# Patient Record
Sex: FEMALE | Race: WHITE | Employment: PART TIME | URBAN - METROPOLITAN AREA
[De-identification: names, ages, dates, MRNs, and addresses within clinical notes are randomized per-mention and may not be internally consistent; named-entity substitution may affect disease eponyms.]

---
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
## 2022-09-01 ENCOUNTER — APPOINTMENT (OUTPATIENT)
Dept: GENERAL RADIOLOGY | Age: 22
End: 2022-09-01
Payer: COMMERCIAL

## 2022-09-01 ENCOUNTER — HOSPITAL ENCOUNTER (EMERGENCY)
Age: 22
Discharge: HOME | End: 2022-09-01
Payer: COMMERCIAL

## 2022-09-01 VITALS
DIASTOLIC BLOOD PRESSURE: 89 MMHG | WEIGHT: 115 LBS | SYSTOLIC BLOOD PRESSURE: 125 MMHG | HEIGHT: 67 IN | OXYGEN SATURATION: 100 % | HEART RATE: 85 BPM | TEMPERATURE: 98.1 F | BODY MASS INDEX: 18.05 KG/M2 | RESPIRATION RATE: 15 BRPM

## 2022-09-01 DIAGNOSIS — R06.02 SHORTNESS OF BREATH: Primary | ICD-10-CM

## 2022-09-01 DIAGNOSIS — U07.1 SARS-COV-2 POSITIVE: ICD-10-CM

## 2022-09-01 LAB
D DIMER: 0.32 UG/ML FEU (ref 0–0.6)
HCG QUALITATIVE: NEGATIVE

## 2022-09-01 PROCEDURE — 84703 CHORIONIC GONADOTROPIN ASSAY: CPT

## 2022-09-01 PROCEDURE — 99285 EMERGENCY DEPT VISIT HI MDM: CPT

## 2022-09-01 PROCEDURE — 6370000000 HC RX 637 (ALT 250 FOR IP): Performed by: NURSE PRACTITIONER

## 2022-09-01 PROCEDURE — 93005 ELECTROCARDIOGRAM TRACING: CPT | Performed by: EMERGENCY MEDICINE

## 2022-09-01 PROCEDURE — 85379 FIBRIN DEGRADATION QUANT: CPT

## 2022-09-01 PROCEDURE — 71045 X-RAY EXAM CHEST 1 VIEW: CPT

## 2022-09-01 RX ORDER — IPRATROPIUM BROMIDE AND ALBUTEROL SULFATE 2.5; .5 MG/3ML; MG/3ML
1 SOLUTION RESPIRATORY (INHALATION) ONCE
Status: COMPLETED | OUTPATIENT
Start: 2022-09-01 | End: 2022-09-01

## 2022-09-01 RX ORDER — ALBUTEROL SULFATE 90 UG/1
2 AEROSOL, METERED RESPIRATORY (INHALATION) 4 TIMES DAILY PRN
Qty: 18 G | Refills: 0 | Status: SHIPPED | OUTPATIENT
Start: 2022-09-01 | End: 2022-09-09

## 2022-09-01 RX ADMIN — IPRATROPIUM BROMIDE AND ALBUTEROL SULFATE 1 AMPULE: .5; 2.5 SOLUTION RESPIRATORY (INHALATION) at 17:39

## 2022-09-01 ASSESSMENT — PAIN DESCRIPTION - PAIN TYPE: TYPE: ACUTE PAIN

## 2022-09-01 ASSESSMENT — PAIN DESCRIPTION - ORIENTATION: ORIENTATION: RIGHT;LEFT

## 2022-09-01 ASSESSMENT — PAIN SCALES - GENERAL: PAINLEVEL_OUTOF10: 5

## 2022-09-01 ASSESSMENT — PAIN DESCRIPTION - DESCRIPTORS: DESCRIPTORS: PRESSURE

## 2022-09-01 ASSESSMENT — PAIN DESCRIPTION - LOCATION: LOCATION: CHEST

## 2022-09-01 ASSESSMENT — PAIN - FUNCTIONAL ASSESSMENT: PAIN_FUNCTIONAL_ASSESSMENT: 0-10

## 2022-09-01 NOTE — ED NOTES
Incentive spirometer provided to patient and read back teach back method used for instruction. Patient both demonstrated and verbalized correct usage. Discharge instructions explained by ED provider. Patient verbalized understanding and denies any other concerns or complaints at this time. Patient vital signs stable and no acute signs or symptoms of distress noted at discharge. Patient deemed clinically stable. Patient d/c home with family.      Deshaun Jimenes RN  09/01/22 8811

## 2022-09-01 NOTE — ED PROVIDER NOTES
[address] [place] [address]  ED  [address] [address]  Dept: [phone]  Dept Fax: [phone]  Loc: [facility]        This patient was not seen or evaluated by the attending physician. I evaluated this patient, the attending physician was available for consultation. CHIEF COMPLAINT    Chief Complaint   Patient presents with    Positive For Covid-19     Patient reporting she tested positive for COVID on Tuesday. Reports today she started having chest pain        HPI    Fifi Silva is a 25 y.o. female who presents the emergency department with her boyfriend for complaint of shortness of breath and chest pain. She tested positive for COVID on Tuesday. She has been vaccinated. She states she developed chest pain, shortness of breath and coughing. She states it hurts when taking a deep breath in. She denies headache, ear pain, abdominal pain, nausea, vomiting, lightheadedness, dizziness. REVIEW OF SYSTEMS    Cardiac: + chest pain, no palpitations, no syncope  Respiratory: see HPI, + cough  Neurologic: no syncope or LOC  GI: no vomiting, no diarrhea  General: denies fever  All other systems reviewed and are negative. PAST MEDICAL & SURGICAL HISTORY    Past Medical History:   Diagnosis Date    COVID-19      No past surgical history on file.     CURRENT MEDICATIONS  (may include discharge medications prescribed in the ED)  Current Outpatient Rx   Medication Sig Dispense Refill    albuterol sulfate HFA (VENTOLIN HFA) 108 (90 Base) MCG/ACT inhaler Inhale 2 puffs into the lungs 4 times daily as needed for Wheezing Please administer a spacer with this MDI 18 g 0       ALLERGIES    Allergies   Allergen Reactions    Amoxicillin Rash     Itchy papular rash last 2 days RX ? allergy         SOCIAL & FAMILY HISTORY    Social History     Socioeconomic History    Marital status: Unknown   Tobacco Use    Smoking status: Never Smokeless tobacco: Never   Substance and Sexual Activity    Alcohol use: Never    Drug use: Never     No family history on file. PHYSICAL EXAM    VITAL SIGNS: /89   Pulse 85   Temp 98.1 °F (36.7 °C) (Oral)   Resp 15   Ht 5' 7\" (1.702 m)   Wt 115 lb (52.2 kg)   LMP 08/11/2022   SpO2 100%   BMI 18.01 kg/m²    Constitutional:  Well developed, well nourished, no acute distress   HENT:  Atraumatic, moist mucus membranes  Neck: supple, no JVD   Respiratory: Respirations even and unlabored. Breath sounds clear throughout auscultation but she is persistently coughing with a dry bronchospasm cough no distress  Cardiovascular: Regular rhythm and rate, S1-S2. No murmur  Vascular: Radial and DP pulses 2+ and equal bilaterally  GI:  Soft, nontender, normal bowel sounds  Musculoskeletal:  no lower extremity edema, no lower extremity asymmetry, no calf tenderness, no thigh tenderness, no acute deformities  Integument:  Skin is warm and dry, no petechiae   Neurologic:  Alert & oriented, no slurred speech  Psych: Pleasant affect, no hallucinations    EKG    reviewed by myself and interpreted by Dr. Judge Musa  Ventricular rate 83 bpm, WI interval 140 ms, QRS duration 82 ms,  ms,  No ST elevation or depression. Interpretation is a normal sinus rhythm. No old EKGs found for comparison    RADIOLOGY/PROCEDURES    XR CHEST PORTABLE   Final Result   No acute cardiopulmonary findings           Labs Reviewed   D-DIMER, QUANTITATIVE   HCG, SERUM, QUALITATIVE       ED COURSE & MEDICAL DECISION MAKING    Pertinent Labs & Imaging studies reviewed and interpreted. (See chart for details)    See chart for details of medications given during the ED stay.     Vitals:    09/01/22 1610 09/01/22 1614 09/01/22 1741   BP:  125/89    Pulse:  85 85   Resp:  14 15   Temp:  98.1 °F (36.7 °C)    TempSrc:  Oral    SpO2:  100% 100%   Weight: 115 lb (52.2 kg)     Height: 5' 7\" (1.702 m)       Medications   ipratropium-albuterol (DUONEB) nebulizer solution 1 ampule (1 ampule Inhalation Given 9/1/22 2683)     I have seen and evaluated this patient. My attending physician was available for consultation. Differential Diagnosis: Pulmonary Embolus, Pneumonia, Pneumothorax, other    She is nontoxic in appearance and hemodynamically stable. She has a persistent cough. I ordered a nebulizer treatment. She states she feels a lot better and that the cough has actually subsided. Chest x-ray interpreted by radiology reviewed by myself showed no acute cardiopulmonary findings. D-dimer 0.32    Patient was reassured. I will send her home with an incentive spirometer and MDI. She was given PCP to follow-up as needed. She was instructed to return to the emergency department otherwise for worsening symptoms. Patient and her boyfriend verbalized understanding of the discharge instructions and she ambulated out of the emergency department with a steady gait. FINAL IMPRESSION    1. Shortness of breath    2.  SARS-CoV-2 positive        PLAN  Discharge    (Please note that this note was completed with a voice recognition program.  Every attempt was made to edit the dictations, but inevitably there remain words that are mis-transcribed.)        Sabra Good, DIAZ - FIDEL  09/02/22 8864

## 2022-09-02 LAB
EKG ATRIAL RATE: 83 BPM
EKG DIAGNOSIS: NORMAL
EKG P AXIS: 62 DEGREES
EKG P-R INTERVAL: 140 MS
EKG Q-T INTERVAL: 352 MS
EKG QRS DURATION: 82 MS
EKG QTC CALCULATION (BAZETT): 413 MS
EKG R AXIS: 88 DEGREES
EKG T AXIS: 48 DEGREES
EKG VENTRICULAR RATE: 83 BPM

## 2022-09-02 PROCEDURE — 93010 ELECTROCARDIOGRAM REPORT: CPT | Performed by: INTERNAL MEDICINE

## 2022-09-09 ENCOUNTER — TELEMEDICINE (OUTPATIENT)
Dept: PRIMARY CARE CLINIC | Age: 22
End: 2022-09-09
Payer: COMMERCIAL

## 2022-09-09 DIAGNOSIS — Z76.89 ENCOUNTER TO ESTABLISH CARE: Primary | ICD-10-CM

## 2022-09-09 DIAGNOSIS — U07.1 COVID-19: ICD-10-CM

## 2022-09-09 PROCEDURE — 1036F TOBACCO NON-USER: CPT | Performed by: FAMILY MEDICINE

## 2022-09-09 PROCEDURE — G8427 DOCREV CUR MEDS BY ELIG CLIN: HCPCS | Performed by: FAMILY MEDICINE

## 2022-09-09 PROCEDURE — 99204 OFFICE O/P NEW MOD 45 MIN: CPT | Performed by: FAMILY MEDICINE

## 2022-09-09 PROCEDURE — G8419 CALC BMI OUT NRM PARAM NOF/U: HCPCS | Performed by: FAMILY MEDICINE

## 2022-09-09 RX ORDER — NORGESTIMATE AND ETHINYL ESTRADIOL 0.25-0.035
1 KIT ORAL DAILY
COMMUNITY

## 2022-09-09 ASSESSMENT — ENCOUNTER SYMPTOMS
COUGH: 1
SHORTNESS OF BREATH: 0
ABDOMINAL PAIN: 0
EYE PAIN: 0
CONSTIPATION: 0
DIARRHEA: 0

## 2022-09-09 NOTE — PROGRESS NOTES
2022    TELEHEALTH EVALUATION -- Audio/Visual (During KPRPS-38 public health emergency)    HPI:    Jo Shea (:  2000) has requested an audio/video evaluation for the following concern(s):    Seen in ED on 2022 for shortness of breath and cough and CP- diagnosed with COVID. Neb treatment in ED. Reviewed CXR. Feeling back to normal now. Returning to work today. H/o anxiety and depression - sees therapist once weekly. 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 Review of Systems   Constitutional:  Negative for appetite change, chills, fatigue and fever. HENT:  Negative for congestion. Eyes:  Negative for pain and visual disturbance. Respiratory:  Positive for cough. Negative for shortness of breath. Cardiovascular:  Negative for chest pain and palpitations. Gastrointestinal:  Negative for abdominal pain, constipation and diarrhea. Genitourinary:  Negative for difficulty urinating. Musculoskeletal:  Negative for arthralgias. Skin:  Negative for rash and wound. Neurological:  Negative for dizziness, weakness, light-headedness and headaches. Hematological:  Does not bruise/bleed easily. Psychiatric/Behavioral:  Negative for behavioral problems. Prior to Visit Medications    Medication Sig Taking? Authorizing Provider   norgestimate-ethinyl estradiol (ORTHO-CYCLEN) 0.25-35 MG-MCG per tablet Take 1 tablet by mouth daily Yes Historical Provider, MD       Social History     Tobacco Use    Smoking status: Never    Smokeless tobacco: Never   Vaping Use    Vaping Use: Never used   Substance Use Topics    Alcohol use: Never    Drug use: Never        Allergies   Allergen Reactions    Amoxicillin Rash     Itchy papular rash last 2 days RX ? allergy     ,   Past Medical History:   Diagnosis Date    Anxiety     COVID-19     Mild episode of recurrent major depressive disorder ([facility] [address]    , History reviewed. No pertinent surgical history. ,   Social History     Tobacco Use    Smoking status: Never    Smokeless tobacco: Never   Vaping Use    Vaping Use: Never used   Substance Use Topics    Alcohol use: Never    Drug use: Never   ,   Family History   Problem Relation Age of Onset    Osteoarthritis Mother     Hypertension Father     High Cholesterol Father     No Known Problems Sister     No Known Problems Brother    ,   Immunization History   Administered Date(s) Administered    COVID-19, PFIZER GRAY top, DO NOT Dilute, (age 15 y+), IM, 30 mcg/0.3 mL 07/26/2022    COVID-19, PFIZER PURPLE top, DILUTE for use, (age 15 y+), 30mcg/0.3mL 07/09/2021, 07/31/2021    DTaP vaccine 2002, 02/21/2003, 04/23/2003, 11/29/2007    DTaP/HiB 01/21/2004    HIB PRP-T (ActHIB, Hiberix) 2002, 02/21/2003, 04/23/2003    Hepatitis A Ped/Adol (Havrix, Vaqta) 03/12/2020, 03/25/2021    Hepatitis B Ped/Adol (Engerix-B, Recombivax HB) 2000, 07/23/2003    Influenza A (V4G7-83) Vaccine Nasal 11/10/2009, 12/15/2009    Influenza Virus Vaccine 11/12/2015, 11/29/2016, 11/16/2017    Influenza, FLUARIX, FLULAVAL, FLUZONE (age 10 mo+) AND AFLURIA, (age 1 y+), PF, 0.5mL 11/29/2017, 02/11/2019    Influenza, FLUMIST, (age 2-51 y), Live, Intranasal, 0.2mL 02/04/2013    MMR 01/21/2004    Meningococcal B, OMV (Bexsero) 03/25/2021    Meningococcal MPSV4 (Menomune) 02/25/2014    Meningococcal, MCV4, Unspecifd Conjugate (A,C,Y and W-135) 03/12/2020    Pneumococcal Conjugate 7-valent ([place]) 2002, 02/21/2003, 04/23/2003, 11/05/2003    Polio IPV (IPOL) 2002, 02/21/2003, 04/19/2004, 11/29/2007    Tdap (Boostrix, Adacel) 02/25/2014    Varicella (Varivax) 11/05/2003, 11/29/2007   ,   Health Maintenance   Topic Date Due    Hepatitis B vaccine (3 of 3 - 3-dose primary series) 09/17/2003    HPV vaccine (1 - 2-dose series) Never done    Depression Screen  Never done    HIV screen  Never done    Chlamydia screen  Never done    Hepatitis C screen  Never done    Flu vaccine (1) 09/01/2022 DTaP/Tdap/Td vaccine (7 - Td or Tdap) 02/25/2024    Hepatitis A vaccine  Completed    Hib vaccine  Completed    Varicella vaccine  Completed    Meningococcal (ACWY) vaccine  Completed    COVID-19 Vaccine  Completed    Pneumococcal 0-64 years Vaccine  Aged Out       PHYSICAL EXAMINATION:  [ INSTRUCTIONS:  \"[x]\" Indicates a positive item  \"[]\" Indicates a negative item  -- DELETE ALL ITEMS NOT EXAMINED]  [x] Alert  [x] Oriented to person/place/time    [x] No apparent distress  [] Toxic appearing    [] Face flushed appearing [x] Sclera clear  [] Lips are cyanotic      [x] Breathing appears normal  [] Appears tachypneic      [] Rash on visible skin    [x] Cranial Nerves II-XII grossly intact    [] Motor grossly intact in visible upper extremities    [] Motor grossly intact in visible lower extremities    [x] Normal Mood  [] Anxious appearing    [] Depressed appearing  [] Confused appearing      Due to this being a TeleHealth encounter, evaluation of the following organ systems is limited: Vitals/Constitutional/EENT/Resp/CV/GI//MS/Neuro/Skin/Heme-Lymph-Imm. ASSESSMENT/PLAN:  1. Encounter to establish care  VS reviewed from ED note  BMI reviewed from ED note  All questions answered. F/u discussed. Appropriate healthy lifestyle modifications discussed. Uses BC, no refills needed today    2. COVID-19  Symptoms have resolved - breathing at baseline, no CP  She is returning to work today. Fully vaccinated. No questions or concerns. Discussed RTC precaustions. No follow-ups on file. No future appointments. An  electronic signature was used to authenticate this note. --Jasmina Lopez MD on 9/9/2022 at 7:54 AM    {Coding Help -- Use CPT 64419-23081 with EM elements or Time rules for Office Visits. :    >20 minutes were spent on the digital evaluation and management of this patient.     Pursuant to the emergency declaration under the [address] [facility] [place] waiver authority and the "[employer] and [employer] Act, this Virtual  Visit was conducted, with patient's consent, to reduce the patient's risk of exposure to COVID-19 and provide continuity of care for an established patient. Services were provided through a video synchronous discussion virtually to substitute for in-person clinic visit.

## 2024-05-02 ENCOUNTER — HOSPITAL ENCOUNTER (OUTPATIENT)
Dept: GENERAL RADIOLOGY | Age: 24
Discharge: HOME | End: 2024-05-02

## 2024-05-02 ENCOUNTER — HOSPITAL ENCOUNTER (OUTPATIENT)
Age: 24
Discharge: HOME | End: 2024-05-02

## 2024-05-02 DIAGNOSIS — R13.10 DYSPHAGIA, UNSPECIFIED TYPE: ICD-10-CM
